# Patient Record
Sex: MALE
[De-identification: names, ages, dates, MRNs, and addresses within clinical notes are randomized per-mention and may not be internally consistent; named-entity substitution may affect disease eponyms.]

---

## 2023-12-18 PROBLEM — Z00.00 ENCOUNTER FOR PREVENTIVE HEALTH EXAMINATION: Status: ACTIVE | Noted: 2023-12-18

## 2024-01-19 ENCOUNTER — APPOINTMENT (OUTPATIENT)
Dept: UROLOGY | Facility: CLINIC | Age: 54
End: 2024-01-19
Payer: COMMERCIAL

## 2024-01-19 VITALS
DIASTOLIC BLOOD PRESSURE: 85 MMHG | WEIGHT: 175 LBS | HEART RATE: 76 BPM | HEIGHT: 68 IN | OXYGEN SATURATION: 96 % | TEMPERATURE: 97.16 F | SYSTOLIC BLOOD PRESSURE: 134 MMHG | BODY MASS INDEX: 26.52 KG/M2

## 2024-01-19 PROCEDURE — 99204 OFFICE O/P NEW MOD 45 MIN: CPT

## 2024-01-19 NOTE — HISTORY OF PRESENT ILLNESS
[FreeTextEntry1] : 51 YO M seen TODAY 2023 as NPT for vasectomy counselling. HE has 1 child, and desires permanent sterilization. Patient had a maternal grandfather who  with, but not of prostate cancer. He has anxiety, elevated cholesterol, and idiopathic urticaria which presents as rash, cough, swelling lip. NKMA PSA with MAYTE 2023 1.0, to be repeated with PCP on upcoming annual physical. UA  Negative IPSS 2 JONATHON 1 PAUL 25

## 2024-01-19 NOTE — LETTER BODY
[Dear  ___] : Dear  [unfilled], [Consult Letter:] : I had the pleasure of evaluating your patient, [unfilled]. [Please see my note below.] : Please see my note below. [Consult Closing:] : Thank you very much for allowing me to participate in the care of this patient.  If you have any questions, please do not hesitate to contact me. [FreeTextEntry3] : Best Regards,   Ambika Valenzuela MD

## 2024-01-19 NOTE — ASSESSMENT
[FreeTextEntry1] : BECKY BEE would be  a good candidate for the no-scalpel vasectomy performed in the office under local anesthesia and I discussed this in detail with him including the risks, alternatives, and chances for success, that he should consider this as a permanent procedure, that he should refrain from any aspirin, Advil, or Aleve for a week prior to the procedure, that we would require two sperm counts before we allow him to use this for birth control, and  that procedure is 99% successful.   We discussed alternatives to reversing a vasectomy if necessary, one of which is sperm banking. The other would be testicular sperm extraction at that time if necessary in the future. I discussed with him the unlikely chance of postoperative pain, fever, swelling, infection, bleeding, numbness, testicular atrophy, or persistent sperm in the ejaculate and the unlikely chance that the vasectomy would affect other health issues moving forward.   I gave him an information sheet reviewing this information and a consent form was signed.   As for the varicocele, this should not interfere with the performance of the vasectomy in the office.  Ambika Valenzuela MD

## 2024-01-19 NOTE — PHYSICAL EXAM
[Normal Appearance] : normal appearance [Well Groomed] : well groomed [General Appearance - In No Acute Distress] : no acute distress [Edema] : no peripheral edema [Respiration, Rhythm And Depth] : normal respiratory rhythm and effort [Exaggerated Use Of Accessory Muscles For Inspiration] : no accessory muscle use [Abdomen Soft] : soft [Abdomen Tenderness] : non-tender [Costovertebral Angle Tenderness] : no ~M costovertebral angle tenderness [Urethral Meatus] : meatus normal [Penis Abnormality] : normal circumcised penis [Urinary Bladder Findings] : the bladder was normal on palpation [Epididymis] : the epididymides were normal [Testes Tenderness] : no tenderness of the testes [Testes Mass (___cm)] : there were no testicular masses [Prostate Tenderness] : the prostate was not tender [No Prostate Nodules] : no prostate nodules [Prostate Size ___ (0-4)] : prostate size [unfilled] (scale: 0-4) [Normal Station and Gait] : the gait and station were normal for the patient's age [] : no rash [No Focal Deficits] : no focal deficits [Oriented To Time, Place, And Person] : oriented to person, place, and time [Affect] : the affect was normal [Mood] : the mood was normal [No Palpable Adenopathy] : no palpable adenopathy [de-identified] : Vasa palpable bilaterally in flaccid sac, non-tender. Large left varicocele that empties in supine position.